# Patient Record
Sex: MALE | Race: WHITE | NOT HISPANIC OR LATINO | Employment: STUDENT | URBAN - METROPOLITAN AREA
[De-identification: names, ages, dates, MRNs, and addresses within clinical notes are randomized per-mention and may not be internally consistent; named-entity substitution may affect disease eponyms.]

---

## 2024-10-28 ENCOUNTER — APPOINTMENT (OUTPATIENT)
Dept: RADIOLOGY | Facility: CLINIC | Age: 24
End: 2024-10-28
Payer: COMMERCIAL

## 2024-10-28 VITALS
SYSTOLIC BLOOD PRESSURE: 140 MMHG | WEIGHT: 183 LBS | BODY MASS INDEX: 27.11 KG/M2 | HEIGHT: 69 IN | DIASTOLIC BLOOD PRESSURE: 88 MMHG

## 2024-10-28 DIAGNOSIS — M25.561 ACUTE PAIN OF RIGHT KNEE: ICD-10-CM

## 2024-10-28 DIAGNOSIS — M25.561 ACUTE PAIN OF RIGHT KNEE: Primary | ICD-10-CM

## 2024-10-28 PROCEDURE — 73564 X-RAY EXAM KNEE 4 OR MORE: CPT

## 2024-10-28 PROCEDURE — 99203 OFFICE O/P NEW LOW 30 MIN: CPT | Performed by: FAMILY MEDICINE

## 2024-10-28 NOTE — PATIENT INSTRUCTIONS
I explained the patient that he appears to have contusion of the right knee.  Recommended ice as well as range of motion exercises and return to training as tolerated beginning today.  If any difficulty return to training this week then he is notify physician's office and we will consider further diagnostic imaging.

## 2024-10-28 NOTE — PROGRESS NOTES
1. Acute pain of right knee  XR knee 4+ vw right injury        Orders Placed This Encounter   Procedures    XR knee 4+ vw right injury        IMAGING STUDIES: (I personally reviewed images in PACS and report):  Xray right  10/28/24:  No acute osseous abnormality.      PAST REPORTS:        ASSESSMENT/PLAN:  Right knee contusion  [employer] transfer from [employer]  Date of injury: 10/18/2024  Fall from injury: 10 days    Repeat X-ray next visit: None    Return if symptoms worsen or fail to improve.    Patient instructions below verbally summarized in person during encounter:  Patient Instructions   I explained the patient that he appears to have contusion of the right knee.  Recommended ice as well as range of motion exercises and return to training as tolerated beginning today.  If any difficulty return to training this week then he is notify physician's office and we will consider further diagnostic imaging.      __________________________________________________________________________    HISTORY OF PRESENT ILLNESS:    [facility]  here for evaluation of right knee injury in which patient took a shot to the medial aspect of the right knee in the quadriceps region.  He immediately had significant pain which worsened over next couple days.  Currently now however improving.  He has not yet returned to running.  He has been following with  recommended visit for evaluation.  Continues to have intermittent pain currently mild.        Review of Systems      Following history reviewed and update:    No past medical history on file.  Past Surgical History:   Procedure Laterality Date    TONSILLECTOMY       Social History   Social History     Substance and Sexual Activity   Alcohol Use Yes    Comment: social     Social History     Substance and Sexual Activity   Drug Use Never     Social History     Tobacco Use   Smoking Status Never   Smokeless Tobacco Never     Family History   Problem  "Relation Age of Onset    Diabetes Father      No Known Allergies       Physical Exam  /88 (BP Location: Left arm, Patient Position: Sitting, Cuff Size: Standard)   Ht 5' 8.5\" (1.74 m)   Wt 83 kg (183 lb)   BMI 27.42 kg/m²         Ortho Exam  RIGHT KNEE:  Erythema: no  Swelling: no  Increased Warmth: no  Tenderness: + Mild tenderness soft tissue and medial femoral condyle  Flexion: intact  Extension: intact  Patellar Displacement:  Patellar Tilt:  Patellar Apprehension: negative  Patellar Grind Delgado's: negative  Lachman's: negative  Drawer: negative  Varus laxity: negative  Valgus laxity: negative  Curry: negative        __________________________________________________________________________  Procedures                  "

## 2024-10-28 NOTE — LETTER
October 28, 2024     Patient: Lars Larios  YOB: 2000  Date of Visit: 10/28/2024      To Whom it May Concern:    Lars Larios is under my professional care. Lars was seen in my office on 10/28/2024. Lars may return to gym class or sports on 10/28/24 .    If you have any questions or concerns, please don't hesitate to call.         Sincerely,          Иван Rivera III, DO        CC: No Recipients